# Patient Record
Sex: FEMALE | Race: WHITE | NOT HISPANIC OR LATINO | Employment: UNEMPLOYED | URBAN - METROPOLITAN AREA
[De-identification: names, ages, dates, MRNs, and addresses within clinical notes are randomized per-mention and may not be internally consistent; named-entity substitution may affect disease eponyms.]

---

## 2024-02-21 PROBLEM — J01.00 ACUTE NON-RECURRENT MAXILLARY SINUSITIS: Status: RESOLVED | Noted: 2018-10-01 | Resolved: 2024-02-21

## 2024-09-21 ENCOUNTER — HOSPITAL ENCOUNTER (EMERGENCY)
Facility: HOSPITAL | Age: 29
Discharge: HOME | End: 2024-09-22
Attending: EMERGENCY MEDICINE
Payer: COMMERCIAL

## 2024-09-21 DIAGNOSIS — M62.830 SPASM OF THORACIC BACK MUSCLE: Primary | ICD-10-CM

## 2024-09-21 LAB
ALBUMIN SERPL BCG-MCNC: 4.7 G/DL (ref 3.5–5)
ALP SERPL-CCNC: 80 U/L (ref 34–104)
ALT SERPL W P-5'-P-CCNC: 49 U/L (ref 7–52)
ANION GAP SERPL CALCULATED.3IONS-SCNC: 10 MMOL/L (ref 4–13)
AST SERPL W P-5'-P-CCNC: 46 U/L (ref 13–39)
BACTERIA UR QL AUTO: ABNORMAL /HPF
BASOPHILS # BLD AUTO: 0.04 THOUSANDS/ΜL (ref 0–0.1)
BASOPHILS NFR BLD AUTO: 0 % (ref 0–1)
BILIRUB SERPL-MCNC: 0.42 MG/DL (ref 0.2–1)
BILIRUB UR QL STRIP: NEGATIVE
BUN SERPL-MCNC: 11 MG/DL (ref 5–25)
CALCIUM SERPL-MCNC: 9.4 MG/DL (ref 8.4–10.2)
CHLORIDE SERPL-SCNC: 104 MMOL/L (ref 96–108)
CLARITY UR: CLEAR
CO2 SERPL-SCNC: 27 MMOL/L (ref 21–32)
COLOR UR: YELLOW
CREAT SERPL-MCNC: 0.71 MG/DL (ref 0.6–1.3)
EOSINOPHIL # BLD AUTO: 0.29 THOUSAND/ΜL (ref 0–0.61)
EOSINOPHIL NFR BLD AUTO: 2 % (ref 0–6)
ERYTHROCYTE [DISTWIDTH] IN BLOOD BY AUTOMATED COUNT: 11.7 % (ref 11.6–15.1)
GFR SERPL CREATININE-BSD FRML MDRD: 113 ML/MIN/1.73SQ M
GLUCOSE SERPL-MCNC: 101 MG/DL (ref 65–140)
GLUCOSE UR STRIP-MCNC: NEGATIVE MG/DL
HCT VFR BLD AUTO: 40.5 % (ref 34.8–46.1)
HGB BLD-MCNC: 14 G/DL (ref 11.5–15.4)
HGB UR QL STRIP.AUTO: NEGATIVE
IMM GRANULOCYTES # BLD AUTO: 0.06 THOUSAND/UL (ref 0–0.2)
IMM GRANULOCYTES NFR BLD AUTO: 1 % (ref 0–2)
KETONES UR STRIP-MCNC: ABNORMAL MG/DL
LEUKOCYTE ESTERASE UR QL STRIP: NEGATIVE
LIPASE SERPL-CCNC: 26 U/L (ref 11–82)
LYMPHOCYTES # BLD AUTO: 1.98 THOUSANDS/ΜL (ref 0.6–4.47)
LYMPHOCYTES NFR BLD AUTO: 16 % (ref 14–44)
MCH RBC QN AUTO: 31.9 PG (ref 26.8–34.3)
MCHC RBC AUTO-ENTMCNC: 34.6 G/DL (ref 31.4–37.4)
MCV RBC AUTO: 92 FL (ref 82–98)
MONOCYTES # BLD AUTO: 0.97 THOUSAND/ΜL (ref 0.17–1.22)
MONOCYTES NFR BLD AUTO: 8 % (ref 4–12)
MUCOUS THREADS UR QL AUTO: ABNORMAL
NEUTROPHILS # BLD AUTO: 9.43 THOUSANDS/ΜL (ref 1.85–7.62)
NEUTS SEG NFR BLD AUTO: 73 % (ref 43–75)
NITRITE UR QL STRIP: NEGATIVE
NON-SQ EPI CELLS URNS QL MICRO: ABNORMAL /HPF
NRBC BLD AUTO-RTO: 0 /100 WBCS
PH UR STRIP.AUTO: 6 [PH]
PLATELET # BLD AUTO: 302 THOUSANDS/UL (ref 149–390)
PMV BLD AUTO: 9.8 FL (ref 8.9–12.7)
POTASSIUM SERPL-SCNC: 3.5 MMOL/L (ref 3.5–5.3)
PROT SERPL-MCNC: 7.5 G/DL (ref 6.4–8.4)
PROT UR STRIP-MCNC: ABNORMAL MG/DL
RBC # BLD AUTO: 4.39 MILLION/UL (ref 3.81–5.12)
RBC #/AREA URNS AUTO: ABNORMAL /HPF
SODIUM SERPL-SCNC: 141 MMOL/L (ref 135–147)
SP GR UR STRIP.AUTO: >=1.03 (ref 1–1.03)
UROBILINOGEN UR STRIP-ACNC: 4 MG/DL
WBC # BLD AUTO: 12.77 THOUSAND/UL (ref 4.31–10.16)
WBC #/AREA URNS AUTO: ABNORMAL /HPF

## 2024-09-21 PROCEDURE — 83690 ASSAY OF LIPASE: CPT | Performed by: EMERGENCY MEDICINE

## 2024-09-21 PROCEDURE — 93005 ELECTROCARDIOGRAM TRACING: CPT

## 2024-09-21 PROCEDURE — 99284 EMERGENCY DEPT VISIT MOD MDM: CPT

## 2024-09-21 PROCEDURE — 36415 COLL VENOUS BLD VENIPUNCTURE: CPT | Performed by: EMERGENCY MEDICINE

## 2024-09-21 PROCEDURE — 99285 EMERGENCY DEPT VISIT HI MDM: CPT | Performed by: EMERGENCY MEDICINE

## 2024-09-21 PROCEDURE — 81001 URINALYSIS AUTO W/SCOPE: CPT | Performed by: EMERGENCY MEDICINE

## 2024-09-21 PROCEDURE — 96374 THER/PROPH/DIAG INJ IV PUSH: CPT

## 2024-09-21 PROCEDURE — 80053 COMPREHEN METABOLIC PANEL: CPT | Performed by: EMERGENCY MEDICINE

## 2024-09-21 PROCEDURE — 85025 COMPLETE CBC W/AUTO DIFF WBC: CPT | Performed by: EMERGENCY MEDICINE

## 2024-09-21 RX ORDER — LIDOCAINE 50 MG/G
2 PATCH TOPICAL ONCE
Status: DISCONTINUED | OUTPATIENT
Start: 2024-09-21 | End: 2024-09-22 | Stop reason: HOSPADM

## 2024-09-21 RX ORDER — KETOROLAC TROMETHAMINE 30 MG/ML
15 INJECTION, SOLUTION INTRAMUSCULAR; INTRAVENOUS ONCE
Status: COMPLETED | OUTPATIENT
Start: 2024-09-21 | End: 2024-09-21

## 2024-09-21 RX ADMIN — LIDOCAINE 2 PATCH: 700 PATCH TOPICAL at 23:25

## 2024-09-21 RX ADMIN — KETOROLAC TROMETHAMINE 15 MG: 30 INJECTION, SOLUTION INTRAMUSCULAR; INTRAVENOUS at 23:25

## 2024-09-22 VITALS
HEART RATE: 86 BPM | DIASTOLIC BLOOD PRESSURE: 65 MMHG | OXYGEN SATURATION: 97 % | SYSTOLIC BLOOD PRESSURE: 113 MMHG | RESPIRATION RATE: 16 BRPM | TEMPERATURE: 97 F

## 2024-09-22 LAB
ATRIAL RATE: 89 BPM
P AXIS: 49 DEGREES
PR INTERVAL: 176 MS
QRS AXIS: 35 DEGREES
QRSD INTERVAL: 64 MS
QT INTERVAL: 362 MS
QTC INTERVAL: 440 MS
T WAVE AXIS: 29 DEGREES
VENTRICULAR RATE: 89 BPM

## 2024-09-22 PROCEDURE — 93010 ELECTROCARDIOGRAM REPORT: CPT | Performed by: INTERNAL MEDICINE

## 2024-09-22 PROCEDURE — 96375 TX/PRO/DX INJ NEW DRUG ADDON: CPT

## 2024-09-22 RX ORDER — METHOCARBAMOL 750 MG/1
750 TABLET, FILM COATED ORAL EVERY 8 HOURS PRN
Qty: 20 TABLET | Refills: 0 | Status: SHIPPED | OUTPATIENT
Start: 2024-09-22

## 2024-09-22 RX ORDER — PREDNISONE 20 MG/1
20 TABLET ORAL 2 TIMES DAILY WITH MEALS
Qty: 12 TABLET | Refills: 0 | Status: SHIPPED | OUTPATIENT
Start: 2024-09-22 | End: 2024-09-28

## 2024-09-22 RX ORDER — METHOCARBAMOL 500 MG/1
500 TABLET, FILM COATED ORAL ONCE
Status: COMPLETED | OUTPATIENT
Start: 2024-09-22 | End: 2024-09-22

## 2024-09-22 RX ORDER — DEXAMETHASONE SODIUM PHOSPHATE 10 MG/ML
8 INJECTION, SOLUTION INTRAMUSCULAR; INTRAVENOUS ONCE
Status: COMPLETED | OUTPATIENT
Start: 2024-09-22 | End: 2024-09-22

## 2024-09-22 RX ADMIN — DEXAMETHASONE SODIUM PHOSPHATE 8 MG: 10 INJECTION, SOLUTION INTRAMUSCULAR; INTRAVENOUS at 00:27

## 2024-09-22 RX ADMIN — METHOCARBAMOL TABLETS 500 MG: 500 TABLET, COATED ORAL at 00:27

## 2024-09-22 NOTE — DISCHARGE INSTRUCTIONS
Your pain appears to be due to back muscle spasm.  This can refer pain to the front of the chest as well.  We gave you Toradol, Decadron (a steroid), Robaxin (muscle relaxant) and Lidoderm patch.    You can try heat on your back as needed.  Gentle stretching and massage is helpful.    You may leave the lidocaine patch on for up to 12 hours.  If it helps, purchase more or an alternate topical analgesic cream or patch.    Take the prednisone as directed.  Do not take any nonsteroidal anti-inflammatory drugs such as ibuprofen or naproxen while taking prednisone.  Take prednisone with food.    Use muscle relaxant if it helps you.  If they cause too much drowsiness just take at bedtime.    Call the number below for appointment with the spine center.

## 2024-09-22 NOTE — ED PROVIDER NOTES
"1. Spasm of thoracic back muscle      ED Disposition       ED Disposition   Discharge    Condition   Stable    Date/Time   Sun Sep 22, 2024 12:23 AM    Comment   Cynthia Call discharge to home/self care.                   Assessment & Plan       Medical Decision Making  29-year-old female complains of sudden onset of back pain after stretching her back while seated.  She hyperextended thoracic area and now feels as muscle spasm along the right thoracic paraspinal muscles.  Patient with localized muscle spasm and tenderness doubt fracture, pneumothorax, ACS, esophagitis, pancreatitis, biliary colic, pneumonia dissection.    EKG and labs reassuring.  Given Toradol, IV steroid and skeletal muscle relaxant.  Prescribed skeletal muscle relaxants and steroids.  Ambulatory referral to [facility].    Amount and/or Complexity of Data Reviewed  External Data Reviewed: notes.  Labs: ordered.  ECG/medicine tests: ordered and independent interpretation performed.    Risk  Prescription drug management.                     Medications   ketorolac (TORADOL) injection 15 mg (15 mg Intravenous Given 9/21/24 2325)   dexamethasone (PF) (DECADRON) injection 8 mg (8 mg Intravenous Given 9/22/24 0027)   methocarbamol (ROBAXIN) tablet 500 mg (500 mg Oral Given 9/22/24 0027)       History of Present Illness       29-year-old female with chronic back discomfort felt soreness of her thoracic region.  While sitting on couch she placed her fists behind her back and hyperextended her back.  Was felt \"cracking\" along the right side of the mid thoracic spine.  Feels there is spasm at this time with radiation towards the front of the chest.  Had nausea and vomiting.  No recent illness.  No focal neurologic changes, palpitations, diaphoresis, dyspnea or syncope.  Pain worse with movement.  No recent rash.  Clinically appears to have musculoskeletal pain with localized muscle spasm.        Review of Systems   Constitutional: "  Negative for fever.   Respiratory:  Negative for cough and shortness of breath.    Cardiovascular:  Negative for palpitations and leg swelling.   Gastrointestinal:  Positive for nausea. Negative for abdominal pain and blood in stool.   Neurological:  Negative for syncope, weakness, light-headedness, numbness and headaches.           Objective     ED Triage Vitals   Temperature Pulse Blood Pressure Respirations SpO2 Patient Position - Orthostatic VS   09/21/24 2241 09/21/24 2244 09/21/24 2244 09/21/24 2244 09/21/24 2244 09/21/24 2300   (!) 97 °F (36.1 °C) 94 122/78 18 98 % Sitting      Temp Source Heart Rate Source BP Location FiO2 (%) Pain Score    09/21/24 2241 09/21/24 2300 09/21/24 2300 -- 09/21/24 2325    Temporal Monitor Right arm  10 - Worst Possible Pain        Physical Exam  Vitals and nursing note reviewed.   Constitutional:       General: She is in acute distress (Appears to be in distress when she moves certain wayss).      Appearance: She is well-developed. She is not ill-appearing or diaphoretic.   HENT:      Head: Normocephalic and atraumatic.      Right Ear: External ear normal.      Left Ear: External ear normal.   Eyes:      General: No scleral icterus.     Extraocular Movements: EOM normal.      Conjunctiva/sclera: Conjunctivae normal.      Pupils: Pupils are equal, round, and reactive to light.   Neck:      Vascular: No JVD.   Cardiovascular:      Rate and Rhythm: Normal rate and regular rhythm.      Pulses:           Radial pulses are 3+ on the right side and 3+ on the left side.      Heart sounds: Normal heart sounds. No murmur heard.  Pulmonary:      Effort: Pulmonary effort is normal. No respiratory distress.      Breath sounds: Normal breath sounds.   Chest:      Chest wall: No deformity, tenderness or crepitus.   Abdominal:      General: Bowel sounds are normal.      Palpations: Abdomen is soft. There is no fluid wave or mass.      Tenderness: There is no abdominal tenderness.    Musculoskeletal:         General: No tenderness, deformity or edema. Normal range of motion.        Arms:       Cervical back: Normal range of motion and neck supple. No tenderness.      Right lower leg: No tenderness. No edema.      Left lower leg: No tenderness. No edema.   Skin:     General: Skin is warm and dry.      Capillary Refill: Capillary refill takes less than 2 seconds.      Findings: No bruising, erythema, lesion or rash.   Neurological:      General: No focal deficit present.      Mental Status: She is alert and oriented to person, place, and time. Mental status is at baseline.      Cranial Nerves: No cranial nerve deficit.      Sensory: No sensory deficit.      Motor: No weakness.      Coordination: Coordination normal.      Gait: Gait normal.      Deep Tendon Reflexes: Reflexes are normal and symmetric.   Psychiatric:         Mood and Affect: Mood and affect and mood normal.         Behavior: Behavior normal.         Labs Reviewed   CBC AND DIFFERENTIAL - Abnormal       Result Value    WBC 12.77 (*)     RBC 4.39      Hemoglobin 14.0      Hematocrit 40.5      MCV 92      MCH 31.9      MCHC 34.6      RDW 11.7      MPV 9.8      Platelets 302      nRBC 0      Segmented % 73      Immature Grans % 1      Lymphocytes % 16      Monocytes % 8      Eosinophils Relative 2      Basophils Relative 0      Absolute Neutrophils 9.43 (*)     Absolute Immature Grans 0.06      Absolute Lymphocytes 1.98      Absolute Monocytes 0.97      Eosinophils Absolute 0.29      Basophils Absolute 0.04     COMPREHENSIVE METABOLIC PANEL - Abnormal    Sodium 141      Potassium 3.5      Chloride 104      CO2 27      ANION GAP 10      BUN 11      Creatinine 0.71      Glucose 101      Calcium 9.4      AST 46 (*)     ALT 49      Alkaline Phosphatase 80      Total Protein 7.5      Albumin 4.7      Total Bilirubin 0.42      eGFR 113      Narrative:     [employer] guidelines for Chronic Kidney Disease (CKD):      Stage 1 with normal or high GFR (GFR > 90 mL/min/1.73 square meters)    Stage 2 Mild CKD (GFR = 60-89 mL/min/1.73 square meters)    Stage 3A Moderate CKD (GFR = 45-59 mL/min/1.73 square meters)    Stage 3B Moderate CKD (GFR = 30-44 mL/min/1.73 square meters)    Stage 4 Severe CKD (GFR = 15-29 mL/min/1.73 square meters)    Stage 5 End Stage CKD (GFR <15 mL/min/1.73 square meters)  Note: GFR calculation is accurate only with a steady state creatinine   URINALYSIS WITH REFLEX TO SCOPE - Abnormal    Color, UA Yellow      Clarity, UA Clear      Specific Gravity, UA >=1.030      pH, UA 6.0      Leukocytes, UA Negative      Nitrite, UA Negative      Protein, UA 30 (1+) (*)     Glucose, UA Negative      Ketones, UA Trace (*)     Urobilinogen, UA 4.0 (*)     Bilirubin, UA Negative      Occult Blood, UA Negative     URINE MICROSCOPIC - Abnormal    RBC, UA None Seen      WBC, UA 2-4      Epithelial Cells Innumerable (*)     Bacteria, UA Occasional      MUCUS THREADS Moderate (*)    LIPASE - Normal    Lipase 26       No orders to display       ECG 12 Lead Documentation Only    Date/Time: 9/21/2024 10:48 PM    Performed by: Mani Vaughan DO  Authorized by: Mani Vaughan DO    ECG reviewed by me, the ED Provider: yes    Patient location:  ED  Previous ECG:     Previous ECG:  Unavailable    Comparison to cardiac monitor: No    Quality:     Tracing quality:  Limited by artifact  Rate:     ECG rate assessment: normal    Rhythm:     Rhythm: sinus rhythm    Ectopy:     Ectopy: none    QRS:     QRS axis:  Normal    QRS intervals:  Normal  Conduction:     Conduction: normal    Other findings:     Other findings comment:  Low voltage      ED Medication and Procedure Management   Prior to Admission Medications   Prescriptions Last Dose Informant Patient Reported? Taking?   Multiple Vitamin (MULTIVITAMIN) tablet  Self Yes No   Sig: Take 1 tablet by mouth daily    Patient not taking: Reported on 10/26/2022   Prenatal MV & Min  w/FA-DHA (Prenatal Adult Gummy/DHA/FA) 0.4-25 MG CHEW   Yes No   Sig: Chew   Patient not taking: Reported on 12/27/2021   RA VITAMIN D-3 2000 units CAPS  Self Yes No   Patient not taking: Reported on 10/26/2022   acetaminophen (TYLENOL) 325 mg tablet   No No   Sig: Take 2 tablets (650 mg total) by mouth every 4 (four) hours as needed for mild pain   albuterol (PROVENTIL HFA,VENTOLIN HFA) 90 mcg/act inhaler   Yes No   Sig: Inhale 2 puffs every 6 (six) hours as needed for wheezing   Patient not taking: Reported on 6/24/2021   cholecalciferol (VITAMIN D3) 25 mcg (1,000 units) tablet   Yes No   Patient not taking: Reported on 12/27/2021   hydrOXYzine HCL (ATARAX) 10 mg tablet   Yes No   Sig: TAKE 1 TABLET BY MOUTH 3 TIMES A DAY AS NEEDED FOR ITCHING   ibuprofen (MOTRIN) 600 mg tablet   No No   Sig: Take 1 tablet (600 mg total) by mouth every 6 (six) hours as needed for moderate pain   Patient not taking: Reported on 12/27/2021   ibuprofen (MOTRIN) 600 mg tablet   No No   Sig: Take 1 tablet (600 mg total) by mouth every 6 (six) hours as needed for mild pain   levothyroxine 175 mcg tablet  Self Yes No   Sig: TAKE 1 TABLET BY MOUTH DAILY. TAKE ON EMPTY STOMACH 2 HOURS APART FROM OTHER MEDICATIONS AND FOODS   norethindrone (Ortho Micronor) 0.35 MG tablet   No No   Sig: Take 1 tablet (0.35 mg total) by mouth daily   Patient not taking: Reported on 12/27/2021    oxyCODONE (ROXICODONE) 5 immediate release tablet   Yes No   Sig: TAKE 1 TABLET (5MG TOTAL) BY MOUTH EVERY 6 HOURS AS NEEDED FOR MODERATE PAIN (4-6) MAX 20MG PER DAY   tobramycin (TOBREX) 0.3 % SOLN   No No   Sig: Administer 1 drop to the right eye every 4 (four) hours while awake   Patient not taking: Reported on 9/12/2023      Facility-Administered Medications: None     Discharge Medication List as of 9/22/2024 12:30 AM        START taking these medications    Details   methocarbamol (ROBAXIN) 750 mg tablet Take 1 tablet (750 mg total) by mouth every 8 (eight) hours  as needed for muscle spasms, Starting Sun 9/22/2024, Normal      predniSONE 20 mg tablet Take 1 tablet (20 mg total) by mouth 2 (two) times a day with meals for 6 days, Starting Sun 9/22/2024, Until Sat 9/28/2024, Normal           CONTINUE these medications which have NOT CHANGED    Details   acetaminophen (TYLENOL) 325 mg tablet Take 2 tablets (650 mg total) by mouth every 4 (four) hours as needed for mild pain, Starting Thu 11/4/2021, No Print      albuterol (PROVENTIL HFA,VENTOLIN HFA) 90 mcg/act inhaler Inhale 2 puffs every 6 (six) hours as needed for wheezing, Historical Med      cholecalciferol (VITAMIN D3) 25 mcg (1,000 units) tablet Starting Sun 11/7/2021, Historical Med      hydrOXYzine HCL (ATARAX) 10 mg tablet TAKE 1 TABLET BY MOUTH 3 TIMES A DAY AS NEEDED FOR ITCHING, Historical Med      !! ibuprofen (MOTRIN) 600 mg tablet Take 1 tablet (600 mg total) by mouth every 6 (six) hours as needed for moderate pain, Starting Thu 11/4/2021, No Print      !! ibuprofen (MOTRIN) 600 mg tablet Take 1 tablet (600 mg total) by mouth every 6 (six) hours as needed for mild pain, Starting Tue 9/12/2023, Normal      levothyroxine 175 mcg tablet TAKE 1 TABLET BY MOUTH DAILY. TAKE ON EMPTY STOMACH 2 HOURS APART FROM OTHER MEDICATIONS AND FOODS, Historical Med      Multiple Vitamin (MULTIVITAMIN) tablet Take 1 tablet by mouth daily , Historical Med      norethindrone (Ortho Micronor) 0.35 MG tablet Take 1 tablet (0.35 mg total) by mouth daily, Starting Wed 12/15/2021, Until Wed 3/9/2022, Normal      oxyCODONE (ROXICODONE) 5 immediate release tablet TAKE 1 TABLET (5MG TOTAL) BY MOUTH EVERY 6 HOURS AS NEEDED FOR MODERATE PAIN (4-6) MAX 20MG PER DAY, Historical Med      Prenatal MV & Min w/FA-DHA (Prenatal Adult Gummy/DHA/FA) 0.4-25 MG CHEW Chew, Historical Med      RA VITAMIN D-3 2000 units CAPS Starting Mon 12/3/2018, Historical Med      tobramycin (TOBREX) 0.3 % SOLN Administer 1 drop to the right eye every 4 (four) hours  while awake, Starting Wed 12/7/2022, Normal       !! - Potential duplicate medications found. Please discuss with provider.             Mani Vaughan, DO  09/23/24 1021

## 2024-09-24 ENCOUNTER — TELEPHONE (OUTPATIENT)
Dept: PHYSICAL THERAPY | Facility: OTHER | Age: 29
End: 2024-09-24

## 2024-10-06 NOTE — TELEPHONE ENCOUNTER
Call placed to the patient per [facility] referral.    Spoke with patient, explained program and reason for the call.    Patient is interested but will call back. Patient declined for now, patient states she needs to make sure she has someone available to watch her kids.    [facility] phone number and hours of business provided. Will further assist patient when/if she calls back.    [facility] referral closed per protocol.  
Fever